# Patient Record
Sex: MALE | Race: WHITE | NOT HISPANIC OR LATINO | Employment: FULL TIME | URBAN - METROPOLITAN AREA
[De-identification: names, ages, dates, MRNs, and addresses within clinical notes are randomized per-mention and may not be internally consistent; named-entity substitution may affect disease eponyms.]

---

## 2023-03-21 ENCOUNTER — TELEPHONE (OUTPATIENT)
Dept: GASTROENTEROLOGY | Facility: CLINIC | Age: 33
End: 2023-03-21
Payer: COMMERCIAL

## 2023-03-21 NOTE — TELEPHONE ENCOUNTER
Health Call Center    Phone Message    May a detailed message be left on voicemail: yes     Reason for Call: Other: Patient called to get a definitive confirmation if Direct Function Testing is done for Pancreative Insufficiency.  Patient had gallbladder removed at 16yrs old, has abdominal paid, ostmotic diarrhea, and has lost weight.  He has potential blanca, sibo, bile duct malabsorption, and more.  He also stated results of lowe elastatses, fatty lows, low GFF, and hard to eat.  [facility] [place] and they don't offer the testing.  He has been seen at [facility] there, they have offer a MRCP; however, unknown if they do the testing - he is unhappy with them.  Please follow up with patient (Sorry for all the info - patient request.)     Action Taken: Message routed to:  [facility]    Travel Screening: Not Applicable

## 2023-03-21 NOTE — TELEPHONE ENCOUNTER
"Returned call from patient. Pt wondering if we do direct pancreatic functions test  Has had 3 low elastases to 102, slowly worsening steatorrhea, 0-7 x per day. Worse with fatty foods.  Does have episodes of constipation.  Pancreas negative on CT, s/p meron at age 16. Has been trying to figure this out since May 2022. \"Potential celiac\", bx negative. + SIBO. Bile acid malabsorption, Diarrhea osmotic. Done at [facility], has PCP at [place]. Unknown if patient has had pancreatitis in the past. Meron has not helped with abdominal pain. Scared to eat eat, it hurts. \"Feels like it just sits there\". Has not had gastric emptying study.     PT tried Creon, had subjective improvement in symptoms, 7-8 capsules per day, 36K capsules. Repeated CFA stool fat test while on creon, no improvement. 48hr stool test, fat was 22 grams per day. Not on PPI now. Patient is unhappy with [facility].     Encouraged patient to sent referral for review. Fax number given. Explained we don't to PFTS in the setting of EPI but could review on a broader level of working up pancreatic issues.    ML  "

## 2023-04-20 NOTE — TELEPHONE ENCOUNTER
"[facility] Call Center    Phone Message    May a detailed message be left on voicemail: yes     Reason for Call: Other: EUS and ERCP at [facility] but no one at [facility] can do the Direct Pancreatic tesing Wondering if he can do the EUS here a [facility] along with the direct function test at the same time. He has asked that [facility] send referral but was told they\"do not place outside referrals\" He can have a referral from his PCP or GI provider if needed.     Action Taken: Message routed to:  [facility]    Travel Screening: Not Applicable                                                                      "

## 2023-04-21 NOTE — TELEPHONE ENCOUNTER
"Returned call, had previously spoke to patient. [facility], may work him up for [facility]. Says [facility] will not sent us a referral for EUS w/ PFTs.     Steatorrhea, \"mild diarrhea\". Reviewed that we would not do EUS w/ PFTs just for steatorrhea. Patient is worried about incorrect fecal elastase results. Patient has plan for work up with [facility], encouraged patient to have PCP send referral if he wants us to review records. Pt stated he might have PCP send referral    ML    "